# Patient Record
Sex: FEMALE | Race: WHITE
[De-identification: names, ages, dates, MRNs, and addresses within clinical notes are randomized per-mention and may not be internally consistent; named-entity substitution may affect disease eponyms.]

---

## 2019-01-03 ENCOUNTER — HOSPITAL ENCOUNTER (EMERGENCY)
Dept: HOSPITAL 80 - CED | Age: 29
Discharge: HOME | End: 2019-01-03
Payer: SELF-PAY

## 2019-01-03 VITALS — DIASTOLIC BLOOD PRESSURE: 87 MMHG | SYSTOLIC BLOOD PRESSURE: 120 MMHG

## 2019-01-03 DIAGNOSIS — N64.4: Primary | ICD-10-CM

## 2019-01-03 DIAGNOSIS — Z88.2: ICD-10-CM

## 2019-01-03 DIAGNOSIS — Z97.5: ICD-10-CM

## 2019-01-03 NOTE — EDPHY
H & P


Time Seen by Provider: 01/03/19 17:40


HPI/ROS: 





Chief complaint.  Breast pain





HPI.  Patient is 28-year-old female presents with right breast pain for 1 day.  

She notes on the underside of the breast and sometimes to the medial aspect of 

the breast.  She feels occasionally like it tugs on the inside of the nipple.  

It is somewhat worse with movement.  No swelling, redness, discharge from the 

nipple.  Denies trauma or injury.  She is not breast feeding.  She has an IUD 

and so she has intermittent spotting periods.  No family history breast cancer.

  No chest discomfort inside her chest or shortness of breath.  No fever or 

illness.  No similar symptoms previously





ROS


10 systems were reviewed and negative with the exception of the elements 

mentioned in the history of present illness





Past Medical/Surgical History: 





IUD, otherwise healthy


Social History: 





Single, nonsmoker, no alcohol


Physical Exam: 





General Appearance:  Alert pleasant well-developed female mild distress vital 

signs are stable


Eyes: Pupils equal and round no pallor or injection.


ENT, Mouth:  Mucous membranes are moist.


Respiratory:  There are no retractions, lungs are clear to auscultation.


Cardiovascular: Regular rate and rhythm.


Gastrointestinal:   Abdomen is soft and nontender, no masses, bowel sounds 

normal.


Neurological:  Awake and alert, sensory and motor exams grossly normal.


Skin: Warm and dry, no rashes.


Musculoskeletal:  Neck is supple nontender.  Examination of the right breast 

shows no obvious swelling or redness.  No discharge from the nipple.  No masses 

are palpable.  No axillary lymphadenopathy.


Extremities  symmetrical, full range of motion.


Psychiatric: Patient is oriented X 3, there is no agitation.


Constitutional: 


 Initial Vital Signs











Temperature (C)  36.9 C   01/03/19 17:41


 


Heart Rate  77   01/03/19 17:41


 


Respiratory Rate  16   01/03/19 17:41


 


Blood Pressure  127/92 H  01/03/19 17:41


 


O2 Sat (%)  96   01/03/19 17:41








 











O2 Delivery Mode               Room Air














Allergies/Adverse Reactions: 


 





Sulfa (Sulfonamide Antibiotics) Allergy (Verified 01/03/19 17:44)


 








Home Medications: 














 Medication  Instructions  Recorded


 


NK [No Known Home Meds]  01/03/19














Medical Decision Making





- Diagnostics


Imaging Results: 


Ultrasound right breast reviewed by me and discussed with Dr. Callahan is normal.


ED Course/Re-evaluation: 





Ultrasound is recommended.  Patient is concerned about cost.  We are checking 

to try to find the cost of a breast ultrasound.  We are unable to find the cost 

of breast ultrasound.  The patient pulls up [facility] web site 

from 2017 that shows the cost of breast ultrasound is 200 dollars.  She agrees 

to the ultrasound





Re-evaluation at 7:10 p.m..  Patient is stable.  She and I discussed imaging 

results.  We discussed treatment plan including criteria for return importance 

of follow-up and further evaluation.  She expresses understanding and agreement


Differential Diagnosis: 





I considered infection, abscess, tumor.  This may be musculoskeletal or an 

occult contusion of the breast.





Departure





- Departure


Disposition: Home, Routine, Self-Care


Clinical Impression: 


 Breast pain





Condition: Good


Instructions:  Breast Self Exam for Women (ED)


Additional Instructions: 


Tylenol 1000 mg every 4-6 hours, ibuprofen 600 mg every 6 hr as needed for 

discomfort





Return for worsening symptoms.





[facility]


Referrals: 


NONE *PRIMARY CARE P,. [Primary Care Provider] - As per Instructions


Sabrina Lee DO [Doctor of Osteopathy] - As per Instructions